# Patient Record
(demographics unavailable — no encounter records)

---

## 2024-10-22 NOTE — PHYSICAL EXAM
[Erythema] : no erythema [Enlarged Tonsils] : tonsils not enlarged [Vesicles] : no vesicles [Exudate] : no exudate [Ulcerative Lesions] : no ulcerative lesions [Palate petechiae] : palate without petechiae [Wheezing] : no wheezing [Rales] : no rales [Tachypnea] : no tachypnea [NL] : warm, clear [FreeTextEntry7] : aerating equally bilaterally, rhonchi bilaterally at bases

## 2024-10-22 NOTE — REVIEW OF SYSTEMS
[Tachypnea] : not tachypneic [Wheezing] : no wheezing [Congestion] : no congestion [Shortness of Breath] : no shortness of breath

## 2024-10-22 NOTE — HISTORY OF PRESENT ILLNESS
[de-identified] : Fever on and off x 3days mom states pt having a wet cough no ST, No ear pain no congestion. Tylenol was given this morning.  [FreeTextEntry6] : Wet cough and fever x 3 days, Tmax 103 this morning, mother giving Tylenol prn No congestion or runny nose No SOB, difficulty breathing, chest pain, wheeze or stridor. No nausea, vomiting, diarrhea No headache, sore throat, abdominal pain, rash. No body aches or fatigue. Decreased appetite, drinking and urinating well No sick contacts

## 2024-10-22 NOTE — DISCUSSION/SUMMARY
[FreeTextEntry1] : Discussed with mother pneumonia diagnosis and treatment Amoxicillin prescribed Advised increased fluids, cool mist humidifier, Tylenol or Motrin for fever or discomfort Follow up in 3 days for lung recheck or sooner if fever does not resolve

## 2024-10-24 NOTE — PHYSICAL EXAM
[TextEntry] : Gen: Awake, alert,  In no acute distress , well appearing active Eyes: no periorbital swelling Ears : right  External Auditory Canal:  Normal. Tympanic Membrane:  Normal             left External Auditory Canal:  Normal   Tympanic Membrane:  Normal Pharynx: no redness ,no sores, not inflamed  Neck supple Lymph: anterior and submandibular glands no lymphadenopathy Cardiac : normal rate, regular rhythm, S1,S2 normal, no murmur Lungs:  crackles at base left posterior, right base 1/2 posterior crackles and anterior  no grunting no flaring no retractions

## 2024-10-24 NOTE — DISCUSSION/SUMMARY
[FreeTextEntry1] : improving, afebrile, discussed albuterol mom would like to hold at this time continue amoxicillin as improving no fever mom declines note for no gym , out m/t (has gym these days) follow up 7 to 10 days

## 2024-10-24 NOTE — HISTORY OF PRESENT ILLNESS
[de-identified] : Pt here for lung recheck. Per parent breathing is much better. On day 4 of Amox. [FreeTextEntry6] : SU  is here today for a history of pneumonia no fever since Monday 10/21 with exercise some shortness of breath with exercise coughing at night no wheeze active eating drinking well on amoxicillin for 10 days no chest pain no abdominal pain

## 2024-12-26 NOTE — HISTORY OF PRESENT ILLNESS
[de-identified] : As per Parent, Pt c/o COUGH FOR FEW DAYS. [FreeTextEntry6] : Wet cough for a few days, no signs of resp distress at home Had 2 episodes of diarrhea this AM with generalized abdominal pain when coughing, no pain currently No emesis No fevers, nasal symptoms, ear pain, ST Baseline appetite and UOP Brother sick with similar symptoms

## 2024-12-26 NOTE — PLAN
[TextEntry] : VIRAL INFECTION:   - Discussed viral etiology and rationale for treatment - Maintain hydration, make sure your child drinks small sips of fluids throughout the day. It's normal that they're not eating like they usually do, but hydration is key - Symptomatic treatment with acetaminophen or ibuprofen prn - Supportive care with fluids and rest - Follow up if symptoms are worsening

## 2024-12-26 NOTE — PHYSICAL EXAM
[TextEntry] : General: alert and active in no apparent distress Eyes: conjunctiva clear Ears: TMs translucent bilaterally, normal landmarks noted, normal canals Nose: no rhinorrhea, no mucosal edema OP: no tonsillar enlargement/exudate, no lesions, no posterior pharyngeal erythema Neck: supple, no adenopathy Lungs: clear to auscultation bilaterally, good air exchange, no retractions, comfortable WOB CVS: Normal rate, regular rhythm, no murmur Abdomen: soft, nondistended, nontender, and no hepatosplenomegaly or masses, normoactive bowel sounds Skin: No rashes, lesions or skin changes

## 2025-04-08 NOTE — HISTORY OF PRESENT ILLNESS
[de-identified] : Coughing, fever and possible chest pain this morning. Symptoms first started Sunday. Tylenol given last night  [FreeTextEntry6] : Day 3 of cough had fever initially, none today congestion  ? ST Normal PO No GI symptoms

## 2025-07-02 NOTE — HISTORY OF PRESENT ILLNESS
[Mother] : mother [FreeTextEntry7] : 8 YR C [FreeTextEntry1] : SU  is here for 8 year  well child visit Patient is doing well at home. Past medical history reviewed. Immunizations up to date Oral: discussed brushing teeth twice per day, routine dental visits Behavior/ Activity: exercises 60 min a day, swims rides bike Safety: appropriately restrained in motor vehicle . wear helmet Appetite; good likes fruits, some veggies Sleeping: no concerns Urination and bowel moments normal Current grade did well 3rd grade no concerns re inattention behavior makes alot of noise pt says to be funny no concerns at school no tics Parent(s) have current concerns or issues: followed by allergist defers fluoride makes alot of noise pt says to be funny no concerns at school no tics, mom concern, dad has no concerns eczema history  followed by Dr. Henna watkins

## 2025-07-02 NOTE — PHYSICAL EXAM
[TextEntry] :  Gen: Awake, alert, not in distress well appearing Ears: bilateral tympanic membranes normal light reflex  normal canals Eyes: PERRL Pharynx: non erythematous, palate normal Neck: supple  Cardiac: normal rate, regular rhythm, S1,S2 normal, no murmur Lungs : clear to auscultation  Abdomen: soft, not tender, not distended, , no hepatosplenomegaly Musculoskeletal : full range of motion of hips, knees and ankles, normal gait Neuro: no focal deficits  Genitalia : Jordan 1, testes descended bilaterally, no hernia Back: no scoliosis, normal back

## 2025-07-02 NOTE — PLAN
[TextEntry] :  bmi decreased from 92 to 88%   COUNSELING/EDUCATION Nutritional counseling: Increase vegetables and fruits reviewed  5-2-1-0 Healthy Habits Questionnaire    cardiac screen reviewed and negative   The following 7 to 8 year anticipatory guidance topics were discussed and/or handouts given: school, development and mental health, nutrition and physical activity, oral health and safety. Counseling for nutrition and physical activity was provided.   CARE COORDINATION  reviewed   Immunizations reviewed    SU  may participate age appropriate Activity without restrictions including Physical Education & Athletics Follow up in one year.